# Patient Record
Sex: FEMALE | Race: WHITE
[De-identification: names, ages, dates, MRNs, and addresses within clinical notes are randomized per-mention and may not be internally consistent; named-entity substitution may affect disease eponyms.]

---

## 2017-03-14 ENCOUNTER — HOSPITAL ENCOUNTER (OUTPATIENT)
Dept: HOSPITAL 56 - MW.DI | Age: 47
End: 2017-03-14
Attending: INTERNAL MEDICINE
Payer: MEDICARE

## 2017-03-14 DIAGNOSIS — R50.9: ICD-10-CM

## 2017-03-14 DIAGNOSIS — R91.8: ICD-10-CM

## 2017-03-14 DIAGNOSIS — R10.9: Primary | ICD-10-CM

## 2017-03-14 DIAGNOSIS — N26.1: ICD-10-CM

## 2017-03-14 DIAGNOSIS — N83.209: ICD-10-CM

## 2017-03-14 PROCEDURE — 74177 CT ABD & PELVIS W/CONTRAST: CPT

## 2017-03-14 NOTE — CT
CT of the abdomen and pelvis with contrast.

 

HISTORY: Pain

 

TECHNIQUE: Axial CT images were obtained of the abdomen and pelvis following administration of 100 m
L of Isovue-370 in the right hand without complication. Coronal and sagittal reconstructions obtaine
d.

 

FINDINGS: 

The lung bases are clear, no pleural effusion. Tiny 2 to 4 mm left basilar lung nodules again noted.

 

The liver, spleen, adrenal glands, and pancreas appear normal. The gallbladder is normal. There is n
o bulky retroperitoneal lymphadenopathy or abdominal ascites. There is mild right and moderate left 
renal atrophy. The kidneys enhance and function symmetrically without evidence of obstructive uropat
hy. Renal cortical cysts are noted.

 

The visualized large and small bowel are normal in caliber without evidence of obstruction. No peric
olonic inflammation or stranding. The appendix appears normal. There is a small left ovarian cyst me
asuring 1.7 cm, new since 2/1/2017. Tiny right ovarian cyst noted. Hysterectomy. The urinary bladder
 appears normal. No free pelvic fluid.

 

No suspicious osseous abnormalities identified.

 

IMPRESSION: 

 

1. No acute findings within the abdomen or pelvis.

2. Mild right and moderate left renal atrophy.

3. Small ovarian cysts.

4. Left basilar pulmonary nodules essentially unchanged in comparison to a CT chest dated 1/31/2014 
and technically benign.

## 2018-05-21 ENCOUNTER — HOSPITAL ENCOUNTER (OUTPATIENT)
Dept: HOSPITAL 56 - MW.SDS | Age: 48
Discharge: HOME | End: 2018-05-21
Attending: SURGERY
Payer: MEDICARE

## 2018-05-21 VITALS — SYSTOLIC BLOOD PRESSURE: 133 MMHG | DIASTOLIC BLOOD PRESSURE: 75 MMHG

## 2018-05-21 DIAGNOSIS — Z87.891: ICD-10-CM

## 2018-05-21 DIAGNOSIS — R19.4: ICD-10-CM

## 2018-05-21 DIAGNOSIS — F41.9: ICD-10-CM

## 2018-05-21 DIAGNOSIS — K62.5: ICD-10-CM

## 2018-05-21 DIAGNOSIS — Z79.899: ICD-10-CM

## 2018-05-21 DIAGNOSIS — E78.5: ICD-10-CM

## 2018-05-21 DIAGNOSIS — I12.0: ICD-10-CM

## 2018-05-21 DIAGNOSIS — Z99.2: ICD-10-CM

## 2018-05-21 DIAGNOSIS — J44.9: ICD-10-CM

## 2018-05-21 DIAGNOSIS — N18.6: ICD-10-CM

## 2018-05-21 DIAGNOSIS — K64.8: ICD-10-CM

## 2018-05-21 DIAGNOSIS — E66.9: ICD-10-CM

## 2018-05-21 DIAGNOSIS — K57.30: ICD-10-CM

## 2018-05-21 DIAGNOSIS — K21.9: ICD-10-CM

## 2018-05-21 DIAGNOSIS — G47.33: ICD-10-CM

## 2018-05-21 DIAGNOSIS — Z88.1: ICD-10-CM

## 2018-05-21 DIAGNOSIS — Z88.2: ICD-10-CM

## 2018-05-21 DIAGNOSIS — Z91.018: ICD-10-CM

## 2018-05-21 DIAGNOSIS — K29.50: Primary | ICD-10-CM

## 2018-05-21 PROCEDURE — 45378 DIAGNOSTIC COLONOSCOPY: CPT

## 2018-05-21 PROCEDURE — 43239 EGD BIOPSY SINGLE/MULTIPLE: CPT

## 2018-05-21 NOTE — PCM.PREANE
Preanesthetic Assessment





- Procedure


Proposed Procedure: 





EGD and Colonoscopy








- Anesthesia/Transfusion/Family Hx


Anesthesia History: Prior Anesthesia Without Reaction


Other Type of Anesthesia Reaction Comment: "takes alot to put me to sleep & I 

wake up crabby"


Family History of Anesthesia Reaction: No


Transfusion History: No Prior Transfusion(s)


Intubation History: Unknown


Additional History: 





on dialysis last 2 years; last dialysis [id number].








- Review of Systems


General: Other (chronic kidney failure; on dialysis)


Pulmonary: Shortness of Breath (on exertion), Other (asthma)


Gastrointestinal: Nausea, Other (GERD)


Neurological: Numbness (peripheral neuropathy)


Other: Reports: Anxiety





- Physical Assessment


NPO Status Date: 05/20/18


NPO Status Time: 22:00


O2 Sat by Pulse Oximetry: 99


Respiratory Rate: 16


Vital Signs: 





 Last Vital Signs











Temp  98.2 F   05/21/18 08:54


 


Pulse  66   05/21/18 08:54


 


Resp  16   05/21/18 08:54


 


BP  127/96 H  05/21/18 08:54


 


Pulse Ox  99   05/21/18 08:54











Height: 5 ft 3 in


Weight: 197 lb


ASA Class: 3


Mental Status: Alert & Oriented x3


Airway Class: Mallampati = 1


Dentition: Reports: Dentures (U/L)


Thyro-Mental Finger Breadths: 3


Mouth Opening Finger Breadths: 3


ROM/Head Extension: Full


Lungs: Clear to Auscultation, Normal Respiratory Effort


Cardiovascular: Regular Rate, Regular Rhythm, No Murmurs


Other: wears glasses





- Allergies


Allergies/Adverse Reactions: 


 Allergies











Allergy/AdvReac Type Severity Reaction Status Date / Time


 


quinine Allergy  Hives Verified 05/16/18 12:59


 


Sulfa (Sulfonamide Allergy  Anaphylactic Verified 05/16/18 12:59





Antibiotics)   Shock  


 


vancomycin Allergy  Anaphylactic Verified 05/16/18 12:59





   Shock  


 


avocado Allergy  Swelling Uncoded 02/01/17 20:36


 


paper tape Allergy  Blisters Uncoded 05/16/18 12:59














- Blood


Blood Available: No


Product(s) Available: None





- Anesthesia Plan


Pre-Op Medication Ordered: None





- Acknowledgements


Anesthesia Type Planned: MAC


Pt an Appropriate Candidate for the Planned Anesthesia: Yes


Alternatives and Risks of Anesthesia Discussed w Pt/Guardian: Yes


Pt/Guardian Understands and Agrees with Anesthesia Plan: Yes





PreAnesthesia Questionnaire


HEENT History: Reports: Other (See Below)


Other HEENT History: wears glasses, has top and bottom dentures


Cardiovascular History: Reports: Hypertension


Respiratory History: Reports: Asthma, PE, Sleep Apnea


Other Respiratory History: does not use CPAP, PE 10 yrs ago with penumonia


Gastrointestinal History: Reports: GERD


Other Gastrointestinal History: Heart burn


Genitourinary History: Reports: Dialysis


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 

waiting list for kidney transplant, dialysis on mon-wed-fri,  will have 

dialysis on tues and friday the week of EGD-colonoscopy


OB/GYN History: Reports: Pregnancy


Musculoskeletal History: Reports: Gout


Neurological History: Reports: CVA


Other Neuro History: CVA x3


Psychiatric History: Reports: Anxiety


Endocrine/Metabolic History: Reports: Obesity/BMI 30+


Hematologic History: Reports: None


Oncologic (Cancer) History: Reports: Ovarian


Dermatologic History: Reports: None





- Infectious Disease History


Infectious Disease History: Reports: Chicken Pox





- Past Surgical History


Head Surgeries/Procedures: Reports: None


HEENT Surgical History: Reports: Tonsillectomy


Other Cardiovascular Surgeries/Procedures: angioplasty 10 yrs ago


Female  Surgical History: Reports: Hysterectomy, Salpingo-Oophorectomy


Other Female  Surgeries/Procedures: Left AC dialysis port & later a coil 

place to dialysis catheter





- SUBSTANCE USE


Smoking Status *Q: Former Smoker


Tobacco Use Within Last Twelve Months: No


Second Hand Smoke Exposure: No


Days Per Week of Alcohol Use: 0


Recreational Drug Use History: No





- HOME MEDS


Home Medications: 


 Home Meds





Allopurinol [Zyloprim] 1 tab PO DAILY 04/08/15 [History]


Lisinopril 40 mg PO DAILY 11/12/15 [History]


Ondansetron 4 mg PO ASDIRECTED PRN 11/12/15 [History]


Prenatal Vit No.130/Iron/FA [Prenatal Tablet] 1 tab PO DAILY 11/12/15 [History]


Promethazine [Phenergan] 0.5 tab PO ASDIRECTED PRN 11/12/15 [History]


ALPRAZolam [Alprazolam] 1 tab PO ASDIRECTED PRN 05/16/18 [History]


Albuterol Sulfate [Proair Hfa] 2 puff INH ASDIRECTED PRN 05/16/18 [History]


Doxazosin Mesylate [Cardura] 0.5 tab PO BID 05/16/18 [History]


Fluticasone Propionate [Flonase Allergy Relief] 1 spray NASBOTH ASDIRECTED PRN 

05/16/18 [History]


Indomethacin 25 mg PO TID PRN 05/16/18 [History]


Omeprazole 40 mg PO DAILY 05/16/18 [History]


Ranitidine HCl 150 mg PO BEDTIME 05/16/18 [History]


diphenhydrAMINE HCl [Benadryl Allergy] 1 tab PO ASDIRECTED PRN 05/16/18 [History

]











- CURRENT (IN HOUSE) MEDS


Current Meds: 





 Current Medications





Lactated Ringer's (Ringers, Lactated)  1,000 mls @ 125 mls/hr IV ASDIRECTED GUEVARA





Discontinued Medications





Fentanyl (Sublimaze) Confirm Administered Dose 100 mcg .ROUTE .STK-MED ONE


   Stop: 05/21/18 08:27


Midazolam HCl (Versed 1 Mg/Ml) Confirm Administered Dose 2 mg .ROUTE .STK-MED 

ONE


   Stop: 05/21/18 08:27


Propofol (Diprivan  20 Ml) Confirm Administered Dose 200 mg .ROUTE .STK-MED ONE


   Stop: 05/21/18 08:27

## 2018-05-21 NOTE — OR
SURGEON:

Favian Burgos M.D.

 

DATE OF PROCEDURE:  05/21/2018

 

OPERATION PERFORMED:

Esophagogastroduodenoscopy with biopsy.

 

ANESTHESIA:

MAC.

 

ASA CLASSIFICATION:

III.

 

PREOPERATIVE DIAGNOSIS:

Persistent epigastric pain.

 

POSTOPERATIVE DIAGNOSIS:

Chronic gastritis.

 

DESCRIPTION OF PROCEDURE:

The patient was taken to the endoscopy room, positioned on the endoscopy table

in the left lateral decubitus position.  Time-out was called for appropriate

identification of the patient and procedure.  Monitored anesthesia care was

provided.  A bite block was placed between the patient's teeth.  The gastroscope

was inserted through the bite block into the oropharynx and advanced without

difficulty through the esophagus and stomach into the duodenum where examination

was carried out in a retrograde fashion.  Duodenum shows no acute inflammatory

changes or ulcerations.  Stomach does show mild-to-moderate gastritis.  Antral

biopsies were obtained to look for the presence of Helicobacter pylori.  The

gastroscope was retroflexed to visualize the proximal stomach.  No tumors were

noted proximally.  No ulcers were seen.  The gastroscope was straightened in the

stomach, aspirated and the scope slowly withdrawn.  The GE junction is well

defined and shows no acute inflammatory changes.  No hiatal hernia was noted.

Esophagus demonstrated good contractility.  No mid or proximal lesions were

identified.  The vocal cords were briefly visualized, as the scope was withdrawn

and noted to move symmetrically.  The gastroscope was then removed with the

patient having tolerated the procedure well.

 

Following colonoscopy, she was taken to recovery room in stable condition.

 

 

EVENS / LOLIS

DD:  05/21/2018 09:58:57

DT:  05/21/2018 10:18:35

Job #:  [id number]

## 2018-05-21 NOTE — OR
SURGEON:

Favian Burgos M.D.

 

DATE OF PROCEDURE:  05/21/2018

 

OPERATION PERFORMED:

Colonoscopy.

 

ANESTHESIA:

MAC.

 

ASA CLASSIFICATION:

III.

 

PREOPERATIVE DIAGNOSIS:

Change in bowel habits with rectal bleeding.

 

POSTOPERATIVE DIAGNOSIS:

Sigmoid diverticulosis.

 

DESCRIPTION OF PROCEDURE:

With the patient having completed esophagogastroduodenoscopy, she was maintained

in the left lateral decubitus position.  The colonoscope was inserted into the

rectum and advanced with minimal difficulty to the cecum where the colonoscope

was retroflexed to visualize the ascending colon from below.  The colonoscope

was then straightened and slowly withdrawn.  The cecum, ascending colon, hepatic

flexure, transverse colon, splenic flexure, and descending colon showed no

tumors, polyps, diverticula, or angiodysplastic changes.  Sigmoid colon

demonstrates numerous diverticula.  No stricture, spasm, or bleeding was noted.

No polyps were encountered in the sigmoid colon.  The colonoscope was withdrawn

to the rectum and retroflexed to visualize the anal orifice from above.  The

patient does have some small internal chronic hemorrhoids.  No acute bleeding

was noted.  No polyps were encountered in the distal rectum.  The colonoscope

was straightened, the rectum aspirated, and the colonoscope removed.

 

The patient tolerated the procedure well and was taken to recovery room in

stable condition.

 

 

EVENS DANIELLE

DD:  05/21/2018 10:00:36

DT:  05/21/2018 11:42:10

Job #:  [id number]

## 2018-05-21 NOTE — PCM.OPNOTE
- General Post-Op/Procedure Note


Date of Surgery/Procedure: 05/21/18


Operative Procedure(s): Esophagogastroduodenoscopy with biopsy.  Colonoscopy.


Pre Op Diagnosis: Epigastric pain with progressive heartburn.  Change in bowel 

habits with rectal bleeding.


Post-Op Diagnosis: Acute and chronic gastritis.  Sigmoid diverticulosis.  

Internal hemorrhoids.


Anesthesia Technique: MAC (ASA III)


Primary Surgeon: Favian Burgos


Condition: Good


Free Text/Narrative:: 





DICTATION [id number]





CPT CODE 41978/47593

## 2018-05-21 NOTE — PCM.POSTAN
POST ANESTHESIA ASSESSMENT





- MENTAL STATUS


Mental Status: Alert, Oriented





- RESPIRATORY


Respiratory Status: Respiratory Rate WNL, Airway Patent, O2 Saturation Stable





- CARDIOVASCULAR


CV Status: Pulse Rate WNL, Blood Pressure Stable





- GASTROINTESTINAL


GI Status: No Symptoms





- PAIN


Pain Score: 7 (Cramping pains - gas passing)





- POST OP HYDRATION


Hydration Status: Adequate & Stable